# Patient Record
(demographics unavailable — no encounter records)

---

## 2024-11-07 NOTE — HISTORY OF PRESENT ILLNESS
[Never] : never [TextBox_4] : he is doing OK and had an abnormal spirometry and for evaluation [ESS] : 0

## 2024-11-07 NOTE — ASSESSMENT
[FreeTextEntry1] : Abnormal PFT  The patient had a spirometry and was consistent with restrictive lung disease.  The patient is asymptomatic has no history in the family of any pulmonary disease.  The patient has no occupational exposure.  The chest x-ray was unremarkable.  I reviewed the spirometry and was suboptimal.  The patient coughed in the inspiratory loop.  Will follow-up with full PFT.The patient has no limitation of his exercise capacity.  The baseline oxygen saturation is normal